# Patient Record
Sex: MALE | Employment: UNEMPLOYED | URBAN - METROPOLITAN AREA
[De-identification: names, ages, dates, MRNs, and addresses within clinical notes are randomized per-mention and may not be internally consistent; named-entity substitution may affect disease eponyms.]

---

## 2019-12-24 NOTE — PROGRESS NOTES
[facility] HIV TELECONFERENCE CLINIC NOTE     Date of Service: 12/26/2019    Referring Physician: DAVID    Reason for Referral: Establish care for HIV    Chief Complaint: HIV    History of Present Illness:     Adolph Méndez is a 29 y.o. male [facility] HIV.  Patient states that he was diagnosed in 2018 when he was tested while he was feeling ill.  He was started on Genvoya.  He states that for the most part he has been taking his medications except when he would be moved between facilities at which time he would miss a few days of his medications.  In the recent past, he only misses a few medications every other month.  Apart from Genvoya, he takes a multivitamin which he takes at the same time at night.  His viral load is detectable at 200, it was also 200 and April this year.  He reports no fevers.    Current ART: Genvoya  Prior HIV treatment: None  Resistance: ?  Diagnosed with HIV: 2018  Risk factors: ?  HIV CD4 / viral load at start of treatment: ?  OIs: None known    Vaccines  Refused influenza 11/2019    Pertinent Lab Results:    Date  CD4/%  HIV Viral Load  4/3/2019 770/28.5% 200  11/26/2019 816/31.4% 200    Screening:   HBV serologies: Nonimmune  HAV serology: Immune  HCV Ab: Nonreactive 11/2019  Serum RPR:  PPD: 0 mm 11/2019  Urine GC/CT: Nonreactive 7/2019  UA: No proteinuria 7/2019    LABS  Date  WBC  HGB  PLAT  4/3/2019 5.9  15.2  258  11/26/2019 5.4  15.6  258    Date  CR/BUN GFR  E-LYTES   4/3/2019 0.88  11/26/2019 0.93    Date  TBili  AlkPh  AST ALT Album  4/3/2019 0.5  88  19 16 4.8  11/26/2019 0.3  88  21 16 4.6    Lipids   Date  Chol Trig HDL VLDL LDL  4/3/2019 247 127 65 25 157  11/26/2019 263 97 72 19 172    HgbA1C  Date  HbA1c  11/26/2019 5.3%    Review of Systems:  All other systems reviewed and are negative expect as noted in HPI    Past medical history  HIV    No past surgical history on file.    Family history  Reviewed and not pertinent.      Social History     Socioeconomic History   • Marital  "status: Unknown     Spouse name: Not on file   • Number of children: Not on file   • Years of education: Not on file   • Highest education level: Not on file   Occupational History   • Not on file   Social Needs   • Financial resource strain: Not on file   • Food insecurity:     Worry: Not on file     Inability: Not on file   • Transportation needs:     Medical: Not on file     Non-medical: Not on file   Tobacco Use   • Smoking status: Not on file   Substance and Sexual Activity   • Alcohol use: Not on file   • Drug use: Not on file   • Sexual activity: Not on file   Lifestyle   • Physical activity:     Days per week: Not on file     Minutes per session: Not on file   • Stress: Not on file   Relationships   • Social connections:     Talks on phone: Not on file     Gets together: Not on file     Attends Rastafarian service: Not on file     Active member of club or organization: Not on file     Attends meetings of clubs or organizations: Not on file     Relationship status: Not on file   • Intimate partner violence:     Fear of current or ex partner: Not on file     Emotionally abused: Not on file     Physically abused: Not on file     Forced sexual activity: Not on file   Other Topics Concern   • Not on file   Social History Narrative   • Not on file       Allergies not on file    Medications:  Genvoya  MVI    Physical Exam:   This consultation was conducted utilizing secure and encrypted videoconferencing equipment with the assistance of a trained tele-presenter at the originating site.   Vital Signs: Wt 137.8 Ht 5' 5\" /83 T 96.9 HR 98 RR 17 o2 96%  Vital signs reviewed  Constitutional: Patient is oriented to person, place, and time. Appears well-developed and well-nourished. No distress  Head: Atraumatic, normocephalic  Eyes: Conjunctivae normal. Pupils are equal, round, and reactive to light.   Mouth/Throat: Lips without lesions, oropharynx is clear and moist.  Neck: Neck supple. No " masses/lymphadenopathy  Cardiovascular: Normal rate, regular rhythm, normal S1S2 and intact distal pulses. No murmur, gallop, or friction rub. No pedal edema.  Pulmonary/Chest: No respiratory distress. Unlabored respiratory effort, lungs clear to auscultation. No wheezes or rales.   Abdominal: Soft, non tender. BS + x 4. No masses or hepatosplenomegaly.   Musculoskeletal: No joint tenderness, swelling, erythema, or restriction of motion noted.  Neurological: Alert and oriented to person, place, and time. No gross cranial nerve deficit.   Skin: Skin is warm and dry. No rashes or embolic phenomena noted on exposed skin.  Multiple tattoos noted.  Psychiatric: Normal mood and affect. Behavior is normal.     LABS:  No results found for: WBC, RBC, HEMOGLOBIN, HEMATOCRIT, MCV, MCH, MCHC, MPV  No results found for: SODIUM, POTASSIUM, CHLORIDE, CO2, GLUCOSE, BUN, CREATININE, BUNCREATRAT, GLOMRATE  No results found for: ALKPHOSPHAT, ASTSGOT, ALTSGPT, TBILIRUBIN   No results found for: CPKTOTAL     MICRO:  No results found for: BLOODCULTU, BLDCULT, BCHOLD      Latest pertinent labs were reviewed      Assessment:   Adolph Méndez is a 29 y.o. male with a history of HIV, not virologically suppressed at this time.    Pertinent Diagnoses:    Plan:   HIV:  CD4 count is good but HIV viral load 200 both in labs from November and back in April 2019  Check HIV Genosure ([facility] test number: [id number])  Recheck HIV VL in 4 weeks  -Continue p.o. Genvoya 1 tab daily for now  -Please check serum RPR  -Encouraged continued 100% adherence.  Recommend not taking the multivitamins at the same time as his ART.  Recommend at least 4 hours apart.    Vaccines:  -If not already received, recommend Pneumococcal vaccination with PCV 13, followed by PPSV 23 at least 8 weeks later  -Recommend hepatitis B vaccine series since he is not immune    Hyperlipidemia:  -Patient states that the last labs checked were not fasting.  Lifestyle modifications, recheck  fasting lipid panel next visit  -Low threshold to initiate atorvastatin    Return to clinic in 3 months with HIV Genosure, CD4 count, HIV viral load, CBC with differential, CMP, serum RPR    Rc Askew M.D.    Please note that this dictation was created using voice recognition software. I have worked with technical experts from [facility] to optimize the interface.  I have made every reasonable attempt to correct obvious errors, but there may be errors of grammar and possibly content that I did not discover before finalizing the note.

## 2019-12-26 ENCOUNTER — TELEMEDICINE2 (OUTPATIENT)
Dept: VASCULAR LAB | Facility: MEDICAL CENTER | Age: 29
End: 2019-12-26
Attending: INTERNAL MEDICINE
Payer: OTHER GOVERNMENT

## 2019-12-26 DIAGNOSIS — B20 HIV DISEASE (HCC): ICD-10-CM

## 2019-12-26 DIAGNOSIS — E78.49 OTHER HYPERLIPIDEMIA: ICD-10-CM

## 2019-12-26 PROCEDURE — 99204 OFFICE O/P NEW MOD 45 MIN: CPT | Performed by: INTERNAL MEDICINE

## 2020-01-14 ENCOUNTER — APPOINTMENT (OUTPATIENT)
Dept: VASCULAR LAB | Facility: MEDICAL CENTER | Age: 30
End: 2020-01-14
Attending: INTERNAL MEDICINE
Payer: OTHER GOVERNMENT

## 2020-02-12 ENCOUNTER — TELEPHONE (OUTPATIENT)
Dept: INFECTIOUS DISEASES | Facility: MEDICAL CENTER | Age: 30
End: 2020-02-12

## 2020-04-28 NOTE — PROGRESS NOTES
[facility] HIV TELECONFERENCE CLINIC NOTE     Date of Service: 4/27/2020    Referring Physician: DAVID    Chief Complaint: Follow-up for HIV    History of Present Illness:     Adolph Méndez is a 29 y.o. male [facility] HIV.  Patient states that he was diagnosed in 2018 when he was tested while he was feeling ill.  He was started on Genvoya.  He states that for the most part he has been taking his medications except when he would be moved between facilities at which time he would miss a few days of his medications.  In the recent past, he only misses a few medications every other month.  Apart from Genvoya, he takes a multivitamin which he takes at the same time at night.  His viral load is detectable at 200, it was also 200 and April this year.  He reports no fevers, and he has been compliant with Genvoya since last visit.  His repeat viral load from February is now undetectable, thus Genosure could not be performed.    Patient also notes painful bumps in his scrotum, left side, increasing in size.  He has seen an onsite clinician for this and an ultrasound is pending.  Patient notes no ulcers.  RPR is negative.      Current ART: Genvoya  Prior HIV treatment: None  Resistance: ?  Diagnosed with HIV: 2018  Risk factors: ?  HIV CD4 / viral load at start of treatment: ?  OIs: None known     Vaccines  Refused influenza 11/2019     Pertinent Lab Results:     Date                 CD4/%             HIV Viral Load  4/3/2019          770/28.5%       200  11/26/2019      816/31.4%       200  2/5/2020   <20  3/20/2020 1082/33.8% <20    Screening:   HBV serologies: Nonimmune  HAV serology: Immune  HCV Ab: Nonreactive 11/2019  Serum RPR: Nonreactive 3/2020  PPD: 0 mm 11/2019  Urine GC/CT: Nonreactive 7/2019  UA: No proteinuria 7/2019     LABS  Date                 WBC                HGB                PLAT  4/3/2019          5.9                   15.2                 258  11/26/2019      5.4                   15.6                  258  3/20/2020 6.9  15.8  288     Date                 CR/BUN          GFR                 E-LYTES           4/3/2019          0.88  11/26/2019      0.93  3/20/2020 0.99     Date                 TBili                 AlkPh               AST     ALT      Album  4/3/2019          0.5                   88                    19        16        4.8  11/26/2019      0.3                   88                    21        16        4.6  3/20/2020 0.3  91  22 12 4.7     Lipids   Date                 Chol     Trig      HDL     VLDL   LDL  4/3/2019          247      127      65        25        157  11/26/2019      263      97        72        19        172  3/20/2019 258 118 59  175     HgbA1C  Date                 HbA1c  11/26/2019      5.3%     Review of Systems:  All other systems reviewed and are negative expect as noted in HPI     Past medical history  HIV    No past surgical history on file.    Family history  Reviewed and not pertinent    Social History     Socioeconomic History   • Marital status: Unknown     Spouse name: Not on file   • Number of children: Not on file   • Years of education: Not on file   • Highest education level: Not on file   Occupational History   • Not on file   Social Needs   • Financial resource strain: Not on file   • Food insecurity     Worry: Not on file     Inability: Not on file   • Transportation needs     Medical: Not on file     Non-medical: Not on file   Tobacco Use   • Smoking status: Not on file   Substance and Sexual Activity   • Alcohol use: Not on file   • Drug use: Not on file   • Sexual activity: Not on file   Lifestyle   • Physical activity     Days per week: Not on file     Minutes per session: Not on file   • Stress: Not on file   Relationships   • Social connections     Talks on phone: Not on file     Gets together: Not on file     Attends Mormonism service: Not on file     Active member of club or organization: Not on file     Attends meetings of clubs or organizations:  Not on file     Relationship status: Not on file   • Intimate partner violence     Fear of current or ex partner: Not on file     Emotionally abused: Not on file     Physically abused: Not on file     Forced sexual activity: Not on file   Other Topics Concern   • Not on file   Social History Narrative   • Not on file       No Known Allergies    Medications:  Current Outpatient Medications on File Prior to Visit   Medication Sig Dispense Refill   • Lmqdzfn-Prkhn-Zctadbzo-TenofAF (GENVOYA) [phone] MG Tab Take 1 Tab by mouth every day. 30 Tab 11     No current facility-administered medications on file prior to visit.        Physical Exam:   This consultation was conducted utilizing secure and encrypted videoconferencing equipment with the assistance of a trained tele-presenter at the originating site.     Vital Signs: /82 T 98.1 HR 67 RR 18 o2 98% Wt 133 lbs  Vital signs reviewed  Constitutional: Patient is oriented to person, place, and time. Appears thin. No distress  Head: Atraumatic, normocephalic  Eyes: Conjunctivae normal, EOM intact.   Mouth/Throat: Lips without lesions, oropharynx is clear and moist.  Neck: Neck supple. No masses/lymphadenopathy  Cardiovascular: Normal rate, regular rhythm, normal S1S2 and intact distal pulses. No murmur, gallop, or friction rub. No pedal edema.  Pulmonary/Chest: No respiratory distress. Unlabored respiratory effort, lungs clear to auscultation. No wheezes or rales.   Abdominal: Soft, non tender. BS + x 4. No masses or hepatosplenomegaly.   Musculoskeletal: No joint tenderness, swelling, erythema, or restriction of motion noted.  Neurological: Alert and oriented to person, place, and time. No gross cranial nerve deficit.  Skin: Skin is warm and dry. No rashes or embolic phenomena noted on exposed skin. Multiple tattoos  Psychiatric: Normal mood and affect. Behavior is normal.     LABS:  No results found for: WBC, RBC, HEMOGLOBIN, HEMATOCRIT, MCV, MCH, MCHC, MPV  No  results found for: SODIUM, POTASSIUM, CHLORIDE, CO2, GLUCOSE, BUN, CREATININE, BUNCREATRAT, GLOMRATE  No results found for: ALKPHOSPHAT, ASTSGOT, ALTSGPT, TBILIRUBIN   No results found for: CPKTOTAL     MICRO:  No results found for: BLOODCULTU, BLDCULT, BCHOLD      Latest pertinent labs were reviewed    Assessment:   Adolph Méndez is a 29 y.o. male with a history of HIV,  now virologically suppressed    Plan:   HIV:  -Patient's HIV viral load is now undetectable  -Given consistent hyperlipidemia, will need to initiate atorvastatin at some point. Patient prefers to hold off at this time. However, in anticipation, will switch Genvoya to Biktarvy 1 tab daily to avoid interaction with cobicistat  -Repeat HIV viral load 4 weeks after switching to Biktarvy  -Encouraged continued 100% adherence.  Recommend not taking the multivitamins at the same time as his ART.  Recommend at least 4 hours apart.     Vaccines:  -If not already received, recommend Pneumococcal vaccination with PCV 13, followed by PPSV 23 at least 8 weeks later  -Recommend hepatitis B vaccine series since he is not immune     Hyperlipidemia:  -Consistently high levels, last labs were obtained fasting  -Patient is not obese, exercises about 2 to 3 hours a day, including cardio.  Suspect a genetic component to this, and high likelihood to not achieve control without medications. Patient at this time prefers to not start medication, and anticipates trying something natural once he leaves the half-way  -In any case, anticipating need for a statin at some point in the future, will switch ART as above    Scrotal masses:  -Patient reports multiple small bumps in scrotum, left side, painful, increasing in size.  Patient notes no ulcers.  -Patient has been evaluated by on-site clinician, and an ultrasound is pending.  Agree with this initial evaluation.  Will follow results     Return to clinic in 3 months with CD4 count, HIV viral load, CBC with differential, CMP,  fasting lipid panel    Discussed with VERONICA meneses M.D.    Please note that this dictation was created using voice recognition software. I have worked with technical experts from [facility] to optimize the interface.  I have made every reasonable attempt to correct obvious errors, but there may be errors of grammar and possibly content that I did not discover before finalizing the note.

## 2020-04-29 ENCOUNTER — TELEMEDICINE2 (OUTPATIENT)
Dept: VASCULAR LAB | Facility: MEDICAL CENTER | Age: 30
End: 2020-04-29
Attending: INTERNAL MEDICINE
Payer: OTHER GOVERNMENT

## 2020-04-29 DIAGNOSIS — B20 HIV DISEASE (HCC): ICD-10-CM

## 2020-04-29 DIAGNOSIS — N50.89 SCROTAL MASSES: ICD-10-CM

## 2020-04-29 DIAGNOSIS — E78.49 OTHER HYPERLIPIDEMIA: ICD-10-CM

## 2020-04-29 PROCEDURE — 99214 OFFICE O/P EST MOD 30 MIN: CPT | Performed by: INTERNAL MEDICINE

## 2020-04-29 RX ORDER — BICTEGRAVIR SODIUM, EMTRICITABINE, AND TENOFOVIR ALAFENAMIDE FUMARATE 50; 200; 25 MG/1; MG/1; MG/1
1 TABLET ORAL DAILY
Qty: 30 TAB | Refills: 11 | Status: SHIPPED | OUTPATIENT
Start: 2020-04-29

## 2020-09-01 PROCEDURE — RXMED WILLOW AMBULATORY MEDICATION CHARGE: Performed by: INTERNAL MEDICINE

## 2020-09-04 ENCOUNTER — PHARMACY VISIT (OUTPATIENT)
Dept: PHARMACY | Facility: MEDICAL CENTER | Age: 30
End: 2020-09-04
Payer: OTHER GOVERNMENT

## 2020-10-06 PROCEDURE — RXMED WILLOW AMBULATORY MEDICATION CHARGE: Performed by: INTERNAL MEDICINE

## 2020-10-07 ENCOUNTER — PHARMACY VISIT (OUTPATIENT)
Dept: PHARMACY | Facility: MEDICAL CENTER | Age: 30
End: 2020-10-07
Payer: OTHER GOVERNMENT

## 2020-11-05 PROCEDURE — RXMED WILLOW AMBULATORY MEDICATION CHARGE: Performed by: INTERNAL MEDICINE

## 2020-11-06 ENCOUNTER — PHARMACY VISIT (OUTPATIENT)
Dept: PHARMACY | Facility: MEDICAL CENTER | Age: 30
End: 2020-11-06
Payer: OTHER GOVERNMENT

## 2020-12-07 ENCOUNTER — PHARMACY VISIT (OUTPATIENT)
Dept: PHARMACY | Facility: MEDICAL CENTER | Age: 30
End: 2020-12-07
Payer: OTHER GOVERNMENT

## 2020-12-07 PROCEDURE — RXMED WILLOW AMBULATORY MEDICATION CHARGE: Performed by: INTERNAL MEDICINE

## 2021-01-19 PROCEDURE — RXMED WILLOW AMBULATORY MEDICATION CHARGE: Performed by: INTERNAL MEDICINE

## 2021-01-20 ENCOUNTER — PHARMACY VISIT (OUTPATIENT)
Dept: PHARMACY | Facility: MEDICAL CENTER | Age: 31
End: 2021-01-20
Payer: OTHER GOVERNMENT

## 2021-02-17 PROCEDURE — RXMED WILLOW AMBULATORY MEDICATION CHARGE: Performed by: INTERNAL MEDICINE

## 2021-03-01 ENCOUNTER — PHARMACY VISIT (OUTPATIENT)
Dept: PHARMACY | Facility: MEDICAL CENTER | Age: 31
End: 2021-03-01
Payer: OTHER GOVERNMENT

## 2021-03-25 PROCEDURE — RXMED WILLOW AMBULATORY MEDICATION CHARGE: Performed by: INTERNAL MEDICINE

## 2021-03-26 ENCOUNTER — PHARMACY VISIT (OUTPATIENT)
Dept: PHARMACY | Facility: MEDICAL CENTER | Age: 31
End: 2021-03-26
Payer: OTHER GOVERNMENT

## 2021-04-09 PROCEDURE — RXMED WILLOW AMBULATORY MEDICATION CHARGE: Performed by: INTERNAL MEDICINE

## 2021-04-12 ENCOUNTER — PHARMACY VISIT (OUTPATIENT)
Dept: PHARMACY | Facility: MEDICAL CENTER | Age: 31
End: 2021-04-12
Payer: OTHER GOVERNMENT